# Patient Record
Sex: MALE | Race: WHITE | Employment: OTHER | URBAN - METROPOLITAN AREA
[De-identification: names, ages, dates, MRNs, and addresses within clinical notes are randomized per-mention and may not be internally consistent; named-entity substitution may affect disease eponyms.]

---

## 2019-01-21 ENCOUNTER — APPOINTMENT (OUTPATIENT)
Dept: GENERAL RADIOLOGY | Facility: CLINIC | Age: 69
End: 2019-01-21
Payer: COMMERCIAL

## 2019-01-21 ENCOUNTER — HOSPITAL ENCOUNTER (EMERGENCY)
Facility: CLINIC | Age: 69
Discharge: HOME | End: 2019-01-21
Attending: EMERGENCY MEDICINE | Admitting: EMERGENCY MEDICINE
Payer: COMMERCIAL

## 2019-01-21 VITALS
WEIGHT: 188 LBS | RESPIRATION RATE: 16 BRPM | SYSTOLIC BLOOD PRESSURE: 148 MMHG | DIASTOLIC BLOOD PRESSURE: 101 MMHG | HEART RATE: 82 BPM | BODY MASS INDEX: 26.92 KG/M2 | HEIGHT: 70 IN | OXYGEN SATURATION: 98 % | TEMPERATURE: 97.5 F

## 2019-01-21 DIAGNOSIS — M25.00 HEMARTHROSIS: ICD-10-CM

## 2019-01-21 LAB
APPEARANCE FLD: NORMAL
BASOPHILS # BLD AUTO: 0 10E9/L (ref 0–0.2)
BASOPHILS NFR BLD AUTO: 0.3 %
COLOR FLD: NORMAL
CRP SERPL-MCNC: 96.9 MG/L (ref 0–8)
CRYSTALS SNV MICRO: NORMAL
DIFFERENTIAL METHOD BLD: ABNORMAL
EOSINOPHIL # BLD AUTO: 0 10E9/L (ref 0–0.7)
EOSINOPHIL NFR BLD AUTO: 0.2 %
ERYTHROCYTE [DISTWIDTH] IN BLOOD BY AUTOMATED COUNT: 12.8 % (ref 10–15)
ERYTHROCYTE [SEDIMENTATION RATE] IN BLOOD BY WESTERGREN METHOD: 89 MM/H (ref 0–20)
GRAM STN SPEC: NORMAL
HCT VFR BLD AUTO: 36.2 % (ref 40–53)
HGB BLD-MCNC: 12 G/DL (ref 13.3–17.7)
IMM GRANULOCYTES # BLD: 0 10E9/L (ref 0–0.4)
IMM GRANULOCYTES NFR BLD: 0.2 %
LACTATE BLD-SCNC: 1.1 MMOL/L (ref 0.7–2)
LYMPHOCYTES # BLD AUTO: 1.3 10E9/L (ref 0.8–5.3)
LYMPHOCYTES NFR BLD AUTO: 21.8 %
MCH RBC QN AUTO: 29.1 PG (ref 26.5–33)
MCHC RBC AUTO-ENTMCNC: 33.1 G/DL (ref 31.5–36.5)
MCV RBC AUTO: 88 FL (ref 78–100)
MONOCYTES # BLD AUTO: 0.8 10E9/L (ref 0–1.3)
MONOCYTES NFR BLD AUTO: 13.9 %
NEUTROPHILS # BLD AUTO: 3.8 10E9/L (ref 1.6–8.3)
NEUTROPHILS NFR BLD AUTO: 63.6 %
NRBC # BLD AUTO: 0 10*3/UL
NRBC BLD AUTO-RTO: 0 /100
PLATELET # BLD AUTO: 462 10E9/L (ref 150–450)
RBC # BLD AUTO: 4.12 10E12/L (ref 4.4–5.9)
SPECIMEN SOURCE FLD: NORMAL
SPECIMEN SOURCE: NORMAL
WBC # BLD AUTO: 6 10E9/L (ref 4–11)
WBC # FLD AUTO: NORMAL /UL

## 2019-01-21 PROCEDURE — 36415 COLL VENOUS BLD VENIPUNCTURE: CPT | Performed by: EMERGENCY MEDICINE

## 2019-01-21 PROCEDURE — 86140 C-REACTIVE PROTEIN: CPT | Performed by: EMERGENCY MEDICINE

## 2019-01-21 PROCEDURE — 87040 BLOOD CULTURE FOR BACTERIA: CPT | Mod: 91 | Performed by: EMERGENCY MEDICINE

## 2019-01-21 PROCEDURE — 86140 C-REACTIVE PROTEIN: CPT | Performed by: PHYSICIAN ASSISTANT

## 2019-01-21 PROCEDURE — 25000128 H RX IP 250 OP 636: Performed by: EMERGENCY MEDICINE

## 2019-01-21 PROCEDURE — 96374 THER/PROPH/DIAG INJ IV PUSH: CPT

## 2019-01-21 PROCEDURE — 99284 EMERGENCY DEPT VISIT MOD MDM: CPT | Mod: 25

## 2019-01-21 PROCEDURE — 85652 RBC SED RATE AUTOMATED: CPT | Performed by: EMERGENCY MEDICINE

## 2019-01-21 PROCEDURE — 73030 X-RAY EXAM OF SHOULDER: CPT | Mod: LT

## 2019-01-21 PROCEDURE — 85025 COMPLETE CBC W/AUTO DIFF WBC: CPT | Performed by: EMERGENCY MEDICINE

## 2019-01-21 PROCEDURE — 87070 CULTURE OTHR SPECIMN AEROBIC: CPT | Performed by: PHYSICIAN ASSISTANT

## 2019-01-21 PROCEDURE — 87205 SMEAR GRAM STAIN: CPT | Performed by: PHYSICIAN ASSISTANT

## 2019-01-21 PROCEDURE — 83605 ASSAY OF LACTIC ACID: CPT | Performed by: EMERGENCY MEDICINE

## 2019-01-21 PROCEDURE — 89060 EXAM SYNOVIAL FLUID CRYSTALS: CPT | Performed by: PHYSICIAN ASSISTANT

## 2019-01-21 RX ORDER — CEPHALEXIN 500 MG/1
500 CAPSULE ORAL 4 TIMES DAILY
Qty: 28 CAPSULE | Refills: 0 | Status: SHIPPED | OUTPATIENT
Start: 2019-01-21 | End: 2019-01-28

## 2019-01-21 RX ORDER — CEFAZOLIN SODIUM 2 G/100ML
2 INJECTION, SOLUTION INTRAVENOUS ONCE
Status: COMPLETED | OUTPATIENT
Start: 2019-01-21 | End: 2019-01-21

## 2019-01-21 RX ADMIN — CEFAZOLIN SODIUM 2 G: 2 INJECTION, SOLUTION INTRAVENOUS at 11:16

## 2019-01-21 ASSESSMENT — ENCOUNTER SYMPTOMS
FEVER: 0
SHORTNESS OF BREATH: 0
NUMBNESS: 0

## 2019-01-21 ASSESSMENT — MIFFLIN-ST. JEOR: SCORE: 1639.01

## 2019-01-21 NOTE — ED PROVIDER NOTES
"  History     Chief Complaint:  Wound Infection       HPI   Aneudy Chauhan is a 68 year old male 4 weeks S/P Rotator Cuff Repair who presents alone to the Emergency Department for evaluation of wound infection. The patient had a left shoulder rotator cuff repair on 12/26/2018 at [facility] with Dr. Gomez, and was seen yesterday at [facility] due to concern for a left shoulder infection. He was started on a 10 day course of 500 mg Keflex. The patient notes that he took a hot shower last night, and the wound site started to have bloody purulent drainage.  This morning the patient called [facility], and was directed to come to the E.D     In regards to his post op recovery, the patient reports of increase of pain and an increase in restricted range of motion. So far, he has had 2 sessions of physical therapy. He denies any fever, chest pain, shortness of breath, numbness or tingling or fevers.         Allergies:  Neomycin    Medications:    Nimodipine    Past Medical History:    HTN     Past Surgical History:    ORTHOPEDIC SURGERY  BACK SURGERY    Family History:    History reviewed. No pertinent family history.    Social History:  Marital Status:    The patient presents alone.  Smoking Status: Former Smoker  Smokeless Tobacco: Former User  Alcohol Use: NA     Review of Systems   Constitutional: Negative for fever.   Respiratory: Negative for shortness of breath.    Cardiovascular: Negative for chest pain.   Skin:        drainage from incision site    Neurological: Negative for numbness.   All other systems reviewed and are negative.        Physical Exam   First Vitals:  BP: (!) 153/93  Pulse: 94  Temp: 97.5  F (36.4  C)  Resp: 16  Height: 177.8 cm (5' 10\")  Weight: 85.3 kg (188 lb)  SpO2: 99 %      Physical Exam  Physical Exam   Constitutional:  Patient is oriented to person, place, and time. They appear well-developed and well-nourished. Mild distress secondary to post op infection  HENT: "   Mouth/Throat:   Oropharynx is clear and moist.   Eyes:    Conjunctivae normal and EOM are normal. Pupils are equal, round, and reactive to light.   Neck:    Normal range of motion.   Cardiovascular: Normal rate, regular rhythm and normal heart sounds.  Exam reveals no gallop and no friction rub.  No murmur heard.  Pulmonary/Chest:  Effort normal and breath sounds normal. Patient has no wheezes. Patient has no rales.   Abdominal:   Soft. Bowel sounds are normal. Patient exhibits no mass. There is no tenderness. There is no rebound and no guarding.   Musculoskeletal:  limited ROM. Left shoulder edema.  No warmth. Closed incision at lateral aspect of shoulder. No fluctuance  or purulent drainage. Normal sensation   Neurological:   Patient is alert and oriented to person, place, and time. Patient has normal strength. No cranial nerve deficit or sensory deficit. GCS 15  Skin:   Skin is warm and dry. No rash noted. No erythema.   Psychiatric:   Patient has a normal mood and affect. Patient's behavior is normal. Judgment and thought content normal.     Emergency Department Course     Imaging:  Radiology findings were communicated with the patient who voiced understanding of the findings.    XR Shoulder Left G/E 3 Views  There are surgical changes of the left shoulder with  suture anchors in the humeral head. No fracture or other osseous  lesion is seen. The glenohumeral joint is unremarkable. There may have  been surgery of the acromioclavicular joint with possible subacromial  decompression. No other abnormality is seen.   Report per radiology      Laboratory:  Laboratory findings were communicated with the patient who voiced understanding of the findings.      CBC: HGB 12.0 (L) ,  (H)  o/w WNL. (WBC 6.0 )      Blood Culture: In process     Erythrocyte Sedimentation rate: 89    Cell Count with diff: No abdominal cells seen    Crystal ID Synovial fluid: No clinically significant crystals seen    Fluid Culture  Aerobic Bacteria: In process     Gram Stain: In process     Blood Culture: In process     CRP Inflammation: 96.9 (H)     Lactic Acid: 1.1     Interventions:  1116: Ancef 2 g IV    Emergency Department Course:  IV was inserted and blood was drawn for laboratory testing, results above.  The patient was sent for XR while in the emergency department, results above.     Nursing notes and vitals reviewed.  1020: I performed an exam of the patient as documented above.     1056:I spoke with the Orthopedic service  regarding patient's presentation, findings, and plan of care.    1100: Patient rechecked and updated.     1230: Kiki WHEELER from the Orthopedic services performed arthrocentesis.     Findings and plan explained to the Patient. Patient discharged home with instructions regarding supportive care, medications, and reasons to return. The importance of close follow-up was reviewed. The patient was prescribed Keflex      Impression & Plan      Medical Decision Making:  Aneudy Chauhan is a 68 year old male presenting with concern for post-op infection. He has experienced increase and decrease range of motion in his P.T. While he was showering last night, he noticed some purulent drainage come out of move of the incision  sites. On his exam here, there was no warmth to the shoulder. He did have edema of the shoulder and limited range of motion, secondary to pain. X-rays were obtained and  Showed post-operative changes. Blood work including CBC and lactic acid were within normal limits. His sed rate and his CPR are both elevated which are nonspecific. However, given his recent surgery and reports of purulent fluid that was removed, blood cultures and antibiotics were obtained. I also spoke with orthopedic service, and Erna came down and did an arthrocentesis from the shoulder. The only thing that did return was gross blood, so at this time, they feel that he is safe to be discharged as they suspect post op  hemarthrosis which would account for increased pain and ROM. They will like to continue on Keflex. If his cultures are abnormal, he will require a clean out. At this time., they feel comfortable that this alvaro post-operative  Hemarthrosis, and the purulent drainage was likely from a subcutaneous stitch. He will follow up with Dr. Gomez in 1-2 days.       Critical Care time:  none    Diagnosis:    ICD-10-CM    1. Hemarthrosis M25.00 Gram stain     Crystal ID synovial fluid     Fluid Culture Aerobic Bacterial     Cell count with differential fluid       Disposition:  discharged to home    Discharge Medications:     Medication List      Started    cephALEXin 500 MG capsule  Commonly known as:  KEFLEX  500 mg, Oral, 4 TIMES DAILY              Zuly Clifton  1/21/2019    EMERGENCY DEPARTMENT    Scribe Disclosure:  I, Zuly Clifton, am serving as a scribe at 10:20 AM on 1/21/2019 to document services personally performed by Cindy Pineda MD based on my observations and the provider's statements to me.        Cindy Pineda MD  01/22/19 6232

## 2019-01-21 NOTE — CONSULTS
[facility] Orthopedic Consultation    Aneudy Chauhan MRN# [id number]   Age: 68 year old YOB: 1950     Date of Admission:  1/21/2019    Reason for consult: Left shoulder pain and drainage s/p left RCR repair       Requesting physician: Dr Pineda       Level of consult: One-time consult to assist in determining a diagnosis and to recommend an appropriate treatment plan           Assessment and Plan:   Assessment:   Left shoulder pain and drainage s/p arthroscopic Left RCR repair      Plan:   Subacromial space aspirated in ED by Debra Mayfield PA-C.  3 ml bloody fluid/hematoma aspirated.  No purulence seen.  Fluids will be sent for culture.   Patient able to discharge to home with continued pain regimen and prescribed Keflex.  Will continue to monitor labs.  Patient to call [facility] at [phone] for appointment in 2-3 days with Dr Gomez.             Chief Complaint:   Left shoulder pain         History of Present Illness:   This patient is a 68 year old male who presents with the following condition requiring a hospital admission:    Shoulder Pain  Aneudy Chauhan is a 68 year old male 4 weeks S/P Rotator Cuff Repair who presents alone to the Emergency Department for evaluation of wound infection. The patient had a left shoulder rotator cuff repair on 12/26/2018 at [facility] by Dr Gomez, and was seen yesterday at [facility]  for a left shoulder infection. He was started on a 10 day course of 500 mg Keflex qid. The patient notes that he took a hot shower last night, and the wound site started to have bloody purulent drainage.  This morning the patient called [facility], and was directed to come to the E.D      In regards to his post op recovery, the patient reports of increase of pain and an increase in restricted range of motion. So far, he has had 2 sessions of physical therapy. He denies any chest pain, shortness of breath, numbness or tingling or fevers              Past  "Medical History:     Past Medical History:   Diagnosis Date     Hypertension              Past Surgical History:     Past Surgical History:   Procedure Laterality Date     BACK SURGERY       ORTHOPEDIC SURGERY      multiple             Social History:     Social History     Tobacco Use     Smoking status: Former Smoker     Smokeless tobacco: Former User   Substance Use Topics     Alcohol use: Not on file             Family History:   History reviewed. No pertinent family history.          Immunizations:     VACCINE/DOSE   Diptheria   DPT   DTAP   HBIG   Hepatitis A   Hepatitis B   HIB   Influenza   Measles   Meningococcal   MMR   Mumps   Pneumococcal   Polio   Rubella   Small Pox   TDAP   Varicella   Zoster             Allergies:     Allergies   Allergen Reactions     Neomycin              Medications:     Current Facility-Administered Medications   Medication     lidocaine 1 % 10 mL     Current Outpatient Medications   Medication Sig     cephALEXin (KEFLEX) 500 MG capsule Take 1 capsule (500 mg) by mouth 4 times daily for 7 days             Review of Systems:   CV: NEGATIVE for chest pain, palpitations or peripheral edema  C: NEGATIVE for fever, chills, change in weight  E/M: NEGATIVE for ear, mouth and throat problems  R: NEGATIVE for significant cough or SOB          Physical Exam:   All vitals have been reviewed  Patient Vitals for the past 24 hrs:   BP Temp Temp src Pulse Resp SpO2 Height Weight   01/21/19 1300 (!) 124/99 -- -- 87 16 97 % -- --   01/21/19 1230 143/86 -- -- 78 18 97 % -- --   01/21/19 1200 (!) 156/96 -- -- 86 16 98 % -- --   01/21/19 1130 (!) 146/92 -- -- 77 18 96 % -- --   01/21/19 1120 (!) 137/96 -- -- 77 18 97 % -- --   01/21/19 0957 (!) 153/93 97.5  F (36.4  C) Oral 94 16 99 % 1.778 m (5' 10\") 85.3 kg (188 lb)     No intake or output data in the 24 hours ending 01/21/19 1332  On exam of the left shoulder:  Patient laying comfortably in bed.  No acute distress.  Mild erythema lateral portal " site.  No active drainage at time of exam.  Posterior portal site has mild scabbing, no drainage.  Global palpable swelling of left shoulder.     Pain with passive external rotation.  Minimal discomfort with passive internal rotation, flexion of left shoulder.  NVI intact.  Pulses equal bilat.           Data:   All laboratory data reviewed  Results for orders placed or performed during the hospital encounter of 01/21/19   XR Shoulder Left G/E 3 Views    Narrative    LEFT SHOULDER THREE VIEWS   1/21/2019 10:33 AM    HISTORY: Status post left rotator repair, now pain.    COMPARISON: None.      Impression    IMPRESSION: There are surgical changes of the left shoulder with  suture anchors in the humeral head. No fracture or other osseous  lesion is seen. The glenohumeral joint is unremarkable. There may have  been surgery of the acromioclavicular joint with possible subacromial  decompression. No other abnormality is seen.     JHONATAN HORTA MD   CBC with platelets + differential   Result Value Ref Range    WBC 6.0 4.0 - 11.0 10e9/L    RBC Count 4.12 (L) 4.4 - 5.9 10e12/L    Hemoglobin 12.0 (L) 13.3 - 17.7 g/dL    Hematocrit 36.2 (L) 40.0 - 53.0 %    MCV 88 78 - 100 fl    MCH 29.1 26.5 - 33.0 pg    MCHC 33.1 31.5 - 36.5 g/dL    RDW 12.8 10.0 - 15.0 %    Platelet Count 462 (H) 150 - 450 10e9/L    Diff Method Automated Method     % Neutrophils 63.6 %    % Lymphocytes 21.8 %    % Monocytes 13.9 %    % Eosinophils 0.2 %    % Basophils 0.3 %    % Immature Granulocytes 0.2 %    Nucleated RBCs 0 0 /100    Absolute Neutrophil 3.8 1.6 - 8.3 10e9/L    Absolute Lymphocytes 1.3 0.8 - 5.3 10e9/L    Absolute Monocytes 0.8 0.0 - 1.3 10e9/L    Absolute Eosinophils 0.0 0.0 - 0.7 10e9/L    Absolute Basophils 0.0 0.0 - 0.2 10e9/L    Abs Immature Granulocytes 0.0 0 - 0.4 10e9/L    Absolute Nucleated RBC 0.0    Lactic acid   Result Value Ref Range    Lactic Acid 1.1 0.7 - 2.0 mmol/L   Erythrocyte sedimentation rate auto   Result Value  Ref Range    Sed Rate 89 (H) 0 - 20 mm/h   CRP inflammation   Result Value Ref Range    CRP Inflammation 96.9 (H) 0.0 - 8.0 mg/L          Attestation:  I have reviewed today's vital signs, notes, medications, labs and imaging with Dr. Pugh.  Amount of time performed on this consult: 30 minutes.    Kiki Carvajal PA-C

## 2019-01-21 NOTE — ED AVS SNAPSHOT
Emergency Department  [address] [place] [id number]  Phone:  [phone]  Fax:  [phone]                                    Aneudy Chauhan   MRN: [id number]    Department:   Emergency Department   Date of Visit:  1/21/2019           After Visit Summary Signature Page    I have received my discharge instructions, and my questions have been answered. I have discussed any challenges I see with this plan with the nurse or doctor.    ..........................................................................................................................................  Patient/Patient Representative Signature      ..........................................................................................................................................  Patient Representative Print Name and Relationship to Patient    ..................................................               ................................................  Date                                   Time    ..........................................................................................................................................  Reviewed by Signature/Title    ...................................................              ..............................................  Date                                               Time          22EPIC Rev 08/18

## 2019-01-21 NOTE — PROCEDURES
Ortho Procedure note     Aneudy Chauhan was discussed with Dr. Pugh concerning increased pain of left shoulder. Aneudy Chauhan has known arthroscopic left shoulder RCR repair 4 weeks ago with Dr Gomez.    Presents to the ED with concerns regarding left lateral portal site drainage and increasing pain with decreasing ROM.  Denies specific fevers.  Was placed on Keflex via an urgent care yesterday.        Discussion was had with the patient concerning the risks versus benefits of aspiration  Aneudy Chauhan would like to proceed with a left subacromial shoulder aspiration.     On exam:  Left shoulder has a 2+ effusion with minimal erythema.   Pain to palpation primarily over lateral and anterior shoulder.   ROM: Passive external rotation of left shoulder increases pain  NVI with pulses equal bilaterally     Procedure:  Patient was prepped with betadine over the posterior aspect of the left shoulder  3 cc of 1% lidocaine was injected into the subacromial space  A 20 gauge needle was introduced into the same site, 3 ml of bloody fluid was aspirated.  No excessive purulence.     Patient's injection site was covered with 4x4 gauze and tape.  Patient tolerated the procedure well.      Fluid sent to lab for culture and gram stain.  To follow-up in clinic this week for re-check.

## 2019-01-22 NOTE — RESULT ENCOUNTER NOTE
[facility] ED discharge antibiotic (if prescribed):  Cephalexin (Keflex) 500 mg capsule, 1 capsule (500 mg) by mouth 4 times daily for 7 days.  No changes in treatment per General culture protocol.

## 2019-01-26 LAB
BACTERIA SPEC CULT: NO GROWTH
SPECIMEN SOURCE: NORMAL

## 2019-01-27 LAB
BACTERIA SPEC CULT: NO GROWTH
BACTERIA SPEC CULT: NO GROWTH
Lab: NORMAL
Lab: NORMAL
SPECIMEN SOURCE: NORMAL
SPECIMEN SOURCE: NORMAL

## 2019-01-30 ENCOUNTER — HOSPITAL LABORATORY (OUTPATIENT)
Dept: OTHER | Facility: CLINIC | Age: 69
End: 2019-01-30

## 2019-02-04 LAB
BACTERIA SPEC CULT: NO GROWTH
Lab: NORMAL
SPECIMEN SOURCE: NORMAL

## 2019-02-13 LAB
BACTERIA SPEC CULT: NORMAL
Lab: NORMAL
SPECIMEN SOURCE: NORMAL